# Patient Record
Sex: MALE | Race: WHITE | NOT HISPANIC OR LATINO | URBAN - METROPOLITAN AREA
[De-identification: names, ages, dates, MRNs, and addresses within clinical notes are randomized per-mention and may not be internally consistent; named-entity substitution may affect disease eponyms.]

---

## 2018-05-14 PROBLEM — Z00.00 ENCOUNTER FOR PREVENTIVE HEALTH EXAMINATION: Status: ACTIVE | Noted: 2018-05-14

## 2018-12-14 ENCOUNTER — OUTPATIENT (OUTPATIENT)
Dept: OUTPATIENT SERVICES | Facility: HOSPITAL | Age: 53
LOS: 1 days | End: 2018-12-14
Payer: COMMERCIAL

## 2018-12-14 VITALS
HEIGHT: 65.5 IN | DIASTOLIC BLOOD PRESSURE: 74 MMHG | RESPIRATION RATE: 16 BRPM | WEIGHT: 203.05 LBS | TEMPERATURE: 98 F | SYSTOLIC BLOOD PRESSURE: 128 MMHG | HEART RATE: 74 BPM

## 2018-12-14 DIAGNOSIS — R06.83 SNORING: ICD-10-CM

## 2018-12-14 DIAGNOSIS — Z98.890 OTHER SPECIFIED POSTPROCEDURAL STATES: Chronic | ICD-10-CM

## 2018-12-14 DIAGNOSIS — N13.30 UNSPECIFIED HYDRONEPHROSIS: ICD-10-CM

## 2018-12-14 LAB
APPEARANCE UR: CLEAR — SIGNIFICANT CHANGE UP
BILIRUB UR-MCNC: NEGATIVE — SIGNIFICANT CHANGE UP
BLOOD UR QL VISUAL: NEGATIVE — SIGNIFICANT CHANGE UP
BUN SERPL-MCNC: 13 MG/DL — SIGNIFICANT CHANGE UP (ref 7–23)
CALCIUM SERPL-MCNC: 9.2 MG/DL — SIGNIFICANT CHANGE UP (ref 8.4–10.5)
CHLORIDE SERPL-SCNC: 101 MMOL/L — SIGNIFICANT CHANGE UP (ref 98–107)
CO2 SERPL-SCNC: 27 MMOL/L — SIGNIFICANT CHANGE UP (ref 22–31)
COLOR SPEC: YELLOW — SIGNIFICANT CHANGE UP
CREAT SERPL-MCNC: 0.89 MG/DL — SIGNIFICANT CHANGE UP (ref 0.5–1.3)
GLUCOSE SERPL-MCNC: 86 MG/DL — SIGNIFICANT CHANGE UP (ref 70–99)
GLUCOSE UR-MCNC: NEGATIVE — SIGNIFICANT CHANGE UP
HCT VFR BLD CALC: 41.6 % — SIGNIFICANT CHANGE UP (ref 39–50)
HGB BLD-MCNC: 13.7 G/DL — SIGNIFICANT CHANGE UP (ref 13–17)
KETONES UR-MCNC: NEGATIVE — SIGNIFICANT CHANGE UP
LEUKOCYTE ESTERASE UR-ACNC: NEGATIVE — SIGNIFICANT CHANGE UP
MCHC RBC-ENTMCNC: 28.5 PG — SIGNIFICANT CHANGE UP (ref 27–34)
MCHC RBC-ENTMCNC: 32.9 % — SIGNIFICANT CHANGE UP (ref 32–36)
MCV RBC AUTO: 86.7 FL — SIGNIFICANT CHANGE UP (ref 80–100)
NITRITE UR-MCNC: NEGATIVE — SIGNIFICANT CHANGE UP
NRBC # FLD: 0 — SIGNIFICANT CHANGE UP
PH UR: 6 — SIGNIFICANT CHANGE UP (ref 5–8)
PLATELET # BLD AUTO: 212 K/UL — SIGNIFICANT CHANGE UP (ref 150–400)
PMV BLD: 8.9 FL — SIGNIFICANT CHANGE UP (ref 7–13)
POTASSIUM SERPL-MCNC: 4.1 MMOL/L — SIGNIFICANT CHANGE UP (ref 3.5–5.3)
POTASSIUM SERPL-SCNC: 4.1 MMOL/L — SIGNIFICANT CHANGE UP (ref 3.5–5.3)
PROT UR-MCNC: NEGATIVE — SIGNIFICANT CHANGE UP
RBC # BLD: 4.8 M/UL — SIGNIFICANT CHANGE UP (ref 4.2–5.8)
RBC # FLD: 12.9 % — SIGNIFICANT CHANGE UP (ref 10.3–14.5)
SODIUM SERPL-SCNC: 139 MMOL/L — SIGNIFICANT CHANGE UP (ref 135–145)
SP GR SPEC: 1.02 — SIGNIFICANT CHANGE UP (ref 1–1.04)
UROBILINOGEN FLD QL: NORMAL — SIGNIFICANT CHANGE UP
WBC # BLD: 5.96 K/UL — SIGNIFICANT CHANGE UP (ref 3.8–10.5)
WBC # FLD AUTO: 5.96 K/UL — SIGNIFICANT CHANGE UP (ref 3.8–10.5)

## 2018-12-14 PROCEDURE — 93010 ELECTROCARDIOGRAM REPORT: CPT

## 2018-12-14 RX ORDER — SODIUM CHLORIDE 9 MG/ML
1000 INJECTION, SOLUTION INTRAVENOUS
Qty: 0 | Refills: 0 | Status: DISCONTINUED | OUTPATIENT
Start: 2018-12-27 | End: 2019-01-11

## 2018-12-14 NOTE — H&P PST ADULT - NSANTHOSAYNRD_GEN_A_CORE
never tested/No. LEONIDAS screening performed.  STOP BANG Legend: 0-2 = LOW Risk; 3-4 = INTERMEDIATE Risk; 5-8 = HIGH Risk

## 2018-12-14 NOTE — H&P PST ADULT - NS HIV RISK FACTOR
Allergy injection/s given and charted on paper allergy flow sheet.  Patient experienced no reaction.  Radha Garcia    
No

## 2018-12-14 NOTE — H&P PST ADULT - GASTROINTESTINAL DETAILS
soft/no guarding/no organomegaly/nontender/no distention/bowel sounds normal/no bruit/no rebound tenderness/no masses palpable/no rigidity

## 2018-12-14 NOTE — H&P PST ADULT - NEUROLOGICAL DETAILS
no spontaneous movement/responds to pain/responds to verbal commands/normal strength/alert and oriented x 3/sensation intact

## 2018-12-14 NOTE — H&P PST ADULT - ASSESSMENT
53 yrs old male with hydronephrosis and renal calculi presents for preop eval to have cystoscopy right ureteroscopy, Holmium laser endopyelotomy and stent insertion on 12/27/18

## 2018-12-14 NOTE — H&P PST ADULT - NEGATIVE OPHTHALMOLOGIC SYMPTOMS
no discharge R/no pain L/wear corrective lenses for reading/no diplopia/no photophobia/no lacrimation R/no pain R/no blurred vision L/no discharge L/no blurred vision R/no lacrimation L

## 2018-12-14 NOTE — H&P PST ADULT - ANESTHESIA, PREVIOUS REACTION, PROFILE
In the 1970 pt had a dental procedure which he had some delayed awakening and vomiting/nausea/vomiting/delayed awakening

## 2018-12-14 NOTE — H&P PST ADULT - NEGATIVE CARDIOVASCULAR SYMPTOMS
no peripheral edema/no palpitations/no dyspnea on exertion/no claudication/no orthopnea/no chest pain

## 2018-12-14 NOTE — H&P PST ADULT - NEGATIVE GENERAL GENITOURINARY SYMPTOMS
no flank pain R/no bladder infections/no incontinence/no dysuria/no renal colic/no flank pain L/no hematuria

## 2018-12-14 NOTE — H&P PST ADULT - PROBLEM SELECTOR PLAN 1
Scheduled to have cystoscopy right ureteroscopy, Holmium laser endopyelotomy and stent insertion on 12/27/18.   labs pending, EKG in chart.  Preop instructions provided to pt.   Famotidine provided to pt with instructions.

## 2018-12-14 NOTE — H&P PST ADULT - PSH
H/O bilateral inguinal hernia repair  2012  History of oral surgery  In the 1970's  History of tonsillectomy  as a child

## 2018-12-14 NOTE — H&P PST ADULT - RS GEN PE MLT RESP DETAILS PC
no rales/good air movement/no intercostal retractions/respirations non-labored/clear to auscultation bilaterally/airway patent/no wheezes/breath sounds equal/no chest wall tenderness/no rhonchi

## 2018-12-14 NOTE — H&P PST ADULT - HISTORY OF PRESENT ILLNESS
53 yrs old male with h/o sciatica in August and pt had multiple testing included CT scan which showed hydronephrosis and renal calculi. Pt consulted urologist. Pt presents for preop eval to have cystoscopy right ureteroscopy, Holmium laser endopyelotomy and stent insertion on 12/27/18 53 yrs old male with h/o sciatica in August for which pt had multiple testing included CT scan which showed hydronephrosis and renal calculi. Pt consulted urologist. Pt presents for preop eval to have cystoscopy right ureteroscopy, Holmium laser endopyelotomy and stent insertion on 12/27/18

## 2018-12-16 LAB
BACTERIA UR CULT: SIGNIFICANT CHANGE UP
SPECIMEN SOURCE: SIGNIFICANT CHANGE UP

## 2018-12-26 ENCOUNTER — TRANSCRIPTION ENCOUNTER (OUTPATIENT)
Age: 53
End: 2018-12-26

## 2018-12-26 NOTE — ASU PATIENT PROFILE, ADULT - ANESTHESIA, PREVIOUS REACTION, PROFILE
In the 1970 pt had a dental procedure which he had some delayed awakening and vomiting/delayed awakening/nausea/vomiting

## 2018-12-27 ENCOUNTER — OUTPATIENT (OUTPATIENT)
Dept: OUTPATIENT SERVICES | Facility: HOSPITAL | Age: 53
LOS: 1 days | Discharge: HOME | End: 2018-12-27

## 2018-12-27 VITALS
DIASTOLIC BLOOD PRESSURE: 75 MMHG | HEIGHT: 65.5 IN | HEART RATE: 68 BPM | SYSTOLIC BLOOD PRESSURE: 134 MMHG | TEMPERATURE: 98 F | WEIGHT: 203.05 LBS | RESPIRATION RATE: 16 BRPM | OXYGEN SATURATION: 98 %

## 2018-12-27 VITALS — OXYGEN SATURATION: 98 %

## 2018-12-27 DIAGNOSIS — N13.30 UNSPECIFIED HYDRONEPHROSIS: ICD-10-CM

## 2018-12-27 DIAGNOSIS — Z98.890 OTHER SPECIFIED POSTPROCEDURAL STATES: Chronic | ICD-10-CM

## 2018-12-27 RX ORDER — ASPIRIN/CALCIUM CARB/MAGNESIUM 324 MG
1 TABLET ORAL
Qty: 0 | Refills: 0 | COMMUNITY

## 2018-12-27 RX ORDER — TAMSULOSIN HYDROCHLORIDE 0.4 MG/1
1 CAPSULE ORAL
Qty: 30 | Refills: 0 | OUTPATIENT
Start: 2018-12-27 | End: 2019-01-25

## 2018-12-27 RX ORDER — AZTREONAM 2 G
1 VIAL (EA) INJECTION
Qty: 10 | Refills: 0 | OUTPATIENT
Start: 2018-12-27 | End: 2018-12-31

## 2018-12-27 RX ORDER — TRAMADOL HYDROCHLORIDE 50 MG/1
1 TABLET ORAL
Qty: 15 | Refills: 0 | OUTPATIENT
Start: 2018-12-27

## 2018-12-27 RX ORDER — TAMSULOSIN HYDROCHLORIDE 0.4 MG/1
0.4 CAPSULE ORAL ONCE
Qty: 0 | Refills: 0 | Status: COMPLETED | OUTPATIENT
Start: 2018-12-27 | End: 2018-12-27

## 2018-12-27 RX ORDER — ONDANSETRON 8 MG/1
4 TABLET, FILM COATED ORAL ONCE
Qty: 0 | Refills: 0 | Status: COMPLETED | OUTPATIENT
Start: 2018-12-27 | End: 2018-12-27

## 2018-12-27 RX ORDER — OXYCODONE HYDROCHLORIDE 5 MG/1
5 TABLET ORAL ONCE
Qty: 0 | Refills: 0 | Status: DISCONTINUED | OUTPATIENT
Start: 2018-12-27 | End: 2018-12-27

## 2018-12-27 RX ORDER — HYDROMORPHONE HYDROCHLORIDE 2 MG/ML
0.5 INJECTION INTRAMUSCULAR; INTRAVENOUS; SUBCUTANEOUS
Qty: 0 | Refills: 0 | Status: DISCONTINUED | OUTPATIENT
Start: 2018-12-27 | End: 2018-12-27

## 2018-12-27 RX ORDER — ROSUVASTATIN CALCIUM 5 MG/1
0 TABLET ORAL
Qty: 0 | Refills: 0 | COMMUNITY

## 2018-12-27 RX ORDER — TAMSULOSIN HYDROCHLORIDE 0.4 MG/1
1 CAPSULE ORAL
Qty: 30 | Refills: 0
Start: 2018-12-27 | End: 2019-01-25

## 2018-12-27 RX ORDER — TRAMADOL HYDROCHLORIDE 50 MG/1
1 TABLET ORAL
Qty: 15 | Refills: 0
Start: 2018-12-27

## 2018-12-27 RX ADMIN — HYDROMORPHONE HYDROCHLORIDE 0.5 MILLIGRAM(S): 2 INJECTION INTRAMUSCULAR; INTRAVENOUS; SUBCUTANEOUS at 19:00

## 2018-12-27 RX ADMIN — OXYCODONE HYDROCHLORIDE 5 MILLIGRAM(S): 5 TABLET ORAL at 18:06

## 2018-12-27 RX ADMIN — HYDROMORPHONE HYDROCHLORIDE 0.5 MILLIGRAM(S): 2 INJECTION INTRAMUSCULAR; INTRAVENOUS; SUBCUTANEOUS at 18:45

## 2018-12-27 RX ADMIN — HYDROMORPHONE HYDROCHLORIDE 0.5 MILLIGRAM(S): 2 INJECTION INTRAMUSCULAR; INTRAVENOUS; SUBCUTANEOUS at 16:50

## 2018-12-27 RX ADMIN — HYDROMORPHONE HYDROCHLORIDE 0.5 MILLIGRAM(S): 2 INJECTION INTRAMUSCULAR; INTRAVENOUS; SUBCUTANEOUS at 19:30

## 2018-12-27 RX ADMIN — HYDROMORPHONE HYDROCHLORIDE 0.5 MILLIGRAM(S): 2 INJECTION INTRAMUSCULAR; INTRAVENOUS; SUBCUTANEOUS at 17:10

## 2018-12-27 RX ADMIN — HYDROMORPHONE HYDROCHLORIDE 0.5 MILLIGRAM(S): 2 INJECTION INTRAMUSCULAR; INTRAVENOUS; SUBCUTANEOUS at 17:00

## 2018-12-27 RX ADMIN — ONDANSETRON 4 MILLIGRAM(S): 8 TABLET, FILM COATED ORAL at 18:04

## 2018-12-27 RX ADMIN — HYDROMORPHONE HYDROCHLORIDE 0.5 MILLIGRAM(S): 2 INJECTION INTRAMUSCULAR; INTRAVENOUS; SUBCUTANEOUS at 17:26

## 2018-12-27 RX ADMIN — TAMSULOSIN HYDROCHLORIDE 0.4 MILLIGRAM(S): 0.4 CAPSULE ORAL at 19:30

## 2018-12-27 RX ADMIN — SODIUM CHLORIDE 30 MILLILITER(S): 9 INJECTION, SOLUTION INTRAVENOUS at 13:02

## 2018-12-27 RX ADMIN — OXYCODONE HYDROCHLORIDE 5 MILLIGRAM(S): 5 TABLET ORAL at 18:30

## 2018-12-27 NOTE — ASU DISCHARGE PLAN (ADULT/PEDIATRIC). - INSTRUCTIONS
Please drink plenty of water.  It is normal to intermittently have blood in the urine after this procedure.  If you are unable to urinate for an extended period of time, please call the office or go to the nearest ER. You can take over the counter Tylenol and Motrin for your pain

## 2018-12-27 NOTE — ASU DISCHARGE PLAN (ADULT/PEDIATRIC). - MEDICATION SUMMARY - MEDICATIONS TO TAKE
I will START or STAY ON the medications listed below when I get home from the hospital:    traMADol 50 mg oral tablet  -- 1 tab(s) by mouth every 6 hours, As Needed -for severe pain MDD:4 tabs   -- Caution federal law prohibits the transfer of this drug to any person other  than the person for whom it was prescribed.  May cause drowsiness.  Alcohol may intensify this effect.  Use care when operating dangerous machinery.  Obtain medical advice before taking any non-prescription drugs as some may affect the action of this medication.    -- Indication: For Pain    aspirin 81 mg oral tablet  -- 1 tab(s) by mouth 2 times a week, Mon & Wed am  -- Indication: For CAD    Flomax 0.4 mg oral capsule  -- 1 cap(s) by mouth once a day (at bedtime)   -- It is very important that you take or use this exactly as directed.  Do not skip doses or discontinue unless directed by your doctor.  May cause drowsiness.  Alcohol may intensify this effect.  Use care when operating dangerous machinery.  Some non-prescription drugs may aggravate your condition.  Read all labels carefully.  If a warning appears, check with your doctor before taking.  Swallow whole.  Do not crush.  Take with food or milk.    -- Indication: For Stent colic    rosuvastatin 20 mg oral tablet  -- orally 2 times a week, sunday and Wednesday  am  -- Indication: For HLD    Bactrim  mg-160 mg oral tablet  -- 1 tab(s) by mouth 2 times a day   -- Avoid prolonged or excessive exposure to direct and/or artificial sunlight while taking this medication.  Finish all this medication unless otherwise directed by prescriber.  Medication should be taken with plenty of water.    -- Indication: For Prophylaxis

## 2018-12-27 NOTE — ASU DISCHARGE PLAN (ADULT/PEDIATRIC). - NOTIFY
Unable to Urinate/Fever greater than 101/Pain not relieved by Medications/Persistent Nausea and Vomiting/Inability to Tolerate Liquids or Foods

## 2018-12-27 NOTE — ASU DISCHARGE PLAN (ADULT/PEDIATRIC). - CONDITIONS AT DISCHARGE
Patient recovered from anesthesia without complication. V/S  stable, patient afebrile. No external incisions present. Patient tolerating PO intake, denies nausea, denies pain. Patient voided 300cc. Criteria met for D/C home. All D/C instructions given to patient and family with understanding verbalized in return. IV removed with pressure dressing applied.  Patient left PACU A/OX4, via wheelchair in no acute distress, accompanied by perioperative assistant and family.

## 2018-12-27 NOTE — BRIEF OPERATIVE NOTE - PROCEDURE
<<-----Click on this checkbox to enter Procedure Cystoscopy, ureteroscopy, nephroscopy, and endopyelotomy with insertion of ureteral stent and retrograde pyelography  12/27/2018    Active  [id number]

## 2019-05-12 PROBLEM — N20.0 CALCULUS OF KIDNEY: Chronic | Status: ACTIVE | Noted: 2018-12-14

## 2019-05-12 PROBLEM — N13.30 UNSPECIFIED HYDRONEPHROSIS: Chronic | Status: ACTIVE | Noted: 2018-12-14

## 2019-05-16 ENCOUNTER — OUTPATIENT (OUTPATIENT)
Dept: OUTPATIENT SERVICES | Facility: HOSPITAL | Age: 54
LOS: 1 days | End: 2019-05-16

## 2019-05-16 ENCOUNTER — APPOINTMENT (OUTPATIENT)
Dept: NUCLEAR MEDICINE | Facility: HOSPITAL | Age: 54
End: 2019-05-16
Payer: COMMERCIAL

## 2019-05-16 DIAGNOSIS — Z98.890 OTHER SPECIFIED POSTPROCEDURAL STATES: Chronic | ICD-10-CM

## 2019-05-16 DIAGNOSIS — N13.30 UNSPECIFIED HYDRONEPHROSIS: ICD-10-CM

## 2019-05-16 PROCEDURE — 51702 INSERT TEMP BLADDER CATH: CPT

## 2019-05-16 PROCEDURE — 78708 K FLOW/FUNCT IMAGE W/DRUG: CPT | Mod: 26

## 2021-02-08 ENCOUNTER — TRANSCRIPTION ENCOUNTER (OUTPATIENT)
Age: 56
End: 2021-02-08

## 2022-06-11 ENCOUNTER — NON-APPOINTMENT (OUTPATIENT)
Age: 57
End: 2022-06-11

## 2023-12-22 NOTE — H&P PST ADULT - NSANTHAGERD_ENT_A_CORE
Please notify the patient of unremarkable results.     Of note, additional celiac testing is pending due to slightly low hemoglobin A. Yes
